# Patient Record
Sex: MALE | URBAN - METROPOLITAN AREA
[De-identification: names, ages, dates, MRNs, and addresses within clinical notes are randomized per-mention and may not be internally consistent; named-entity substitution may affect disease eponyms.]

---

## 2018-11-08 ENCOUNTER — TELEPHONE (OUTPATIENT)
Dept: TRANSPLANT | Facility: CLINIC | Age: 43
End: 2018-11-08

## 2018-11-08 DIAGNOSIS — Z52.4 KIDNEY DONOR: Primary | ICD-10-CM

## 2018-11-09 ENCOUNTER — TELEPHONE (OUTPATIENT)
Dept: TRANSPLANT | Facility: CLINIC | Age: 43
End: 2018-11-09

## 2018-11-09 ENCOUNTER — DOCUMENTATION ONLY (OUTPATIENT)
Dept: TRANSPLANT | Facility: CLINIC | Age: 43
End: 2018-11-09

## 2018-11-09 DIAGNOSIS — Z52.4 KIDNEY DONOR: ICD-10-CM

## 2018-11-09 LAB
ALBUMIN UR-MCNC: NEGATIVE MG/DL
APPEARANCE UR: CLEAR
BILIRUB UR QL STRIP: NEGATIVE
COLOR UR AUTO: YELLOW
CREAT SERPL-MCNC: 0.85 MG/DL (ref 0.66–1.25)
CREAT UR-MCNC: 107 MG/DL
GFR SERPL CREATININE-BSD FRML MDRD: >90 ML/MIN/1.7M2
GLUCOSE UR STRIP-MCNC: NEGATIVE MG/DL
HGB UR QL STRIP: NEGATIVE
KETONES UR STRIP-MCNC: NEGATIVE MG/DL
LEUKOCYTE ESTERASE UR QL STRIP: NEGATIVE
MICROALBUMIN UR-MCNC: <5 MG/L
MICROALBUMIN/CREAT UR: NORMAL MG/G CR (ref 0–17)
MUCOUS THREADS #/AREA URNS LPF: PRESENT /LPF
NITRATE UR QL: NEGATIVE
PH UR STRIP: 6 PH (ref 5–7)
PROT UR-MCNC: 0.08 G/L
PROT/CREAT 24H UR: 0.07 G/G CR (ref 0–0.2)
RBC #/AREA URNS AUTO: <1 /HPF (ref 0–2)
SOURCE: ABNORMAL
SP GR UR STRIP: 1.01 (ref 1–1.03)
UROBILINOGEN UR STRIP-MCNC: 0 MG/DL (ref 0–2)
WBC #/AREA URNS AUTO: 1 /HPF (ref 0–5)

## 2018-11-09 NOTE — PROGRESS NOTES
Patient arrived to clinic with  for 10 am lab, and requesting to see Funmi Orosco for an appt (for class or evaluation). Pt thought he was scheduled for an appt with her today, but previous documentation doesn't note such, and patient had no appt scheduled to see the RN Coordinator. Left voicemail for Funmi informing her of pt arrival. Instructed pt to wait to hear back from coordinator regarding lab results, and further appts to schedule if needed. Pt voiced understanding and left.    Eric Sanford, BRIANNA

## 2018-11-13 ENCOUNTER — TELEPHONE (OUTPATIENT)
Dept: TRANSPLANT | Facility: CLINIC | Age: 43
End: 2018-11-13

## 2018-11-15 ENCOUNTER — TELEPHONE (OUTPATIENT)
Dept: TRANSPLANT | Facility: CLINIC | Age: 43
End: 2018-11-15

## 2018-11-15 NOTE — TELEPHONE ENCOUNTER
I spoke to the patient using  services on 11/8/2018.  He stated he is interested to make sure his kidney is healthy.  He denied any specific health concerns.  I told him that it is okay for him to be seen if he has concerns regarding being a kidney donor and that he should get labs done to check on his values.  He would like to come to lab on 11/9/2018.  I have made lab appointment and placed orders.  I will follow up with the patient to tell him his lab results and check if he wants to be seen.

## 2018-11-15 NOTE — TELEPHONE ENCOUNTER
I received a voice mail message from Jitendra Berman, [phone], from [facility] financial services.  He let me know that Mr. Estrada wanted to see a [place]rologist [place] [facility] but does not have health insurance currently, and therefore, financial services was contacted.  Mr. Berman reports to me that the patient was a living kidney donor in 2000.  It is unclear whether he thinks he is having problems, or if he wants to be seen for a routine check up.  In either case, he should be seen by Dr. Chente Reynoso or Dr. Eric Kaba or Dr. Jr Rubio.  I called Jitendra back and left him a message indicating that we want Mr. Monique to be able to schedule an appointment with us and that I would contact the appropriate people in our transplant administration to deal with the cost issues.    MARCELLA Balderrama, [facility]  Clinical  and Independent Donor Advocate  [facility]  Pager:  [phone]  Direct:  [phone]

## 2018-11-29 ENCOUNTER — OFFICE VISIT (OUTPATIENT)
Dept: NEPHROLOGY | Facility: CLINIC | Age: 43
End: 2018-11-29
Attending: INTERNAL MEDICINE
Payer: MEDICARE

## 2018-11-29 VITALS
BODY MASS INDEX: 25.04 KG/M2 | TEMPERATURE: 98.3 F | DIASTOLIC BLOOD PRESSURE: 83 MMHG | HEIGHT: 66 IN | HEART RATE: 70 BPM | OXYGEN SATURATION: 95 % | WEIGHT: 155.8 LBS | SYSTOLIC BLOOD PRESSURE: 145 MMHG

## 2018-11-29 DIAGNOSIS — Z00.00 ANNUAL PHYSICAL EXAM: Primary | ICD-10-CM

## 2018-11-29 DIAGNOSIS — Z23 ENCOUNTER FOR VACCINATION: ICD-10-CM

## 2018-11-29 DIAGNOSIS — Z52.4 DONOR OF KIDNEY FOR TRANSPLANT: ICD-10-CM

## 2018-11-29 DIAGNOSIS — Z00.5 TRANSPLANT DONOR EVALUATION: ICD-10-CM

## 2018-11-29 DIAGNOSIS — Z90.5 S/P NEPHRECTOMY: ICD-10-CM

## 2018-11-29 DIAGNOSIS — Z13.1 SCREENING FOR DIABETES MELLITUS: ICD-10-CM

## 2018-11-29 PROCEDURE — T1013 SIGN LANG/ORAL INTERPRETER: HCPCS | Mod: U3,ZF

## 2018-11-29 PROCEDURE — G0008 ADMIN INFLUENZA VIRUS VAC: HCPCS | Mod: ZF

## 2018-11-29 PROCEDURE — G0463 HOSPITAL OUTPT CLINIC VISIT: HCPCS | Mod: 25,ZF

## 2018-11-29 PROCEDURE — 90686 IIV4 VACC NO PRSV 0.5 ML IM: CPT | Mod: ZF | Performed by: INTERNAL MEDICINE

## 2018-11-29 PROCEDURE — 25000128 H RX IP 250 OP 636: Mod: ZF | Performed by: INTERNAL MEDICINE

## 2018-11-29 RX ADMIN — INFLUENZA A VIRUS A/MICHIGAN/45/2015 X-275 (H1N1) ANTIGEN (FORMALDEHYDE INACTIVATED), INFLUENZA A VIRUS A/SINGAPORE/INFIMH-16-0019/2016 IVR-186 (H3N2) ANTIGEN (FORMALDEHYDE INACTIVATED), INFLUENZA B VIRUS B/PHUKET/3073/2013 ANTIGEN (FORMALDEHYDE INACTIVATED), AND INFLUENZA B VIRUS B/MARYLAND/15/2016 BX-69A ANTIGEN (FORMALDEHYDE INACTIVATED) 0.5 ML: 15; 15; 15; 15 INJECTION, SUSPENSION INTRAMUSCULAR at 17:42

## 2018-11-29 ASSESSMENT — PAIN SCALES - GENERAL: PAINLEVEL: NO PAIN (0)

## 2018-11-29 NOTE — LETTER
Date:December 11, 2018      Patient was self referred, no letter generated. Do not send.        [facility] Information

## 2018-11-29 NOTE — NURSING NOTE
"Prior to injection verified patient identity using patient's name and date of birth.  Due to injection administration, patient instructed to remain in clinic for 15 minutes  afterwards, and to report any adverse reaction to me immediately.    Influenza vaccination given per Dr. De Los Santos orders, injection given without complications or questions, see immunizations for details.    Injectable Influenza Immunization Documentation    1.  Has the patient received the information for the injectable influenza vaccine? YES     2. Is the patient 6 months of age or older? YES     3. Does the patient have any of the following contraindications?         Severe allergy to eggs? No     Severe allergic reaction to previous influenza vaccines? No   Severe allergy to latex? No       History of Guillain-[place] syndrome? No     Currently have a temperature greater than 100.4F? No        4.  Severely egg allergic patients should have flu vaccine eligibility assessed by an MD, RN, or pharmacist, and those who received flu vaccine should be observed for 15 min by an MD, RN, Pharmacist, Medical Technician, or member of clinic staff.\": YES    5. Latex-allergic patients should be given latex-free influenza vaccine Yes. Please reference the Vaccine latex table to determine if your clinic s product is latex-containing.       Vaccination given by Shawna Bose [facility]  11/29/2018 5:41 PM            "

## 2018-11-29 NOTE — MR AVS SNAPSHOT
After Visit Summary   11/29/2018    Kayden Estrada    MRN: [id number]           Patient Information     Date Of Birth          1975        Visit Information        Provider Department      11/29/2018 4:00 PM Shanna Alas; 2, Fariba Kidney/Pancreas Recipient [facility]        Today's Diagnoses     Annual physical exam    -  1    Donor of kidney for transplant        Screening for diabetes mellitus        Transplant donor evaluation        S/p nephrectomy        Encounter for vaccination           Follow-ups after your visit        Your next 10 appointments already scheduled     Dec 03, 2018  7:30 AM CST   Lab with  LAB   [facility] ([facility])    [address] [place] [address] Floor  [facility] [address] [phone]              Future tests that were ordered for you today     Open Future Orders        Priority Expected Expires Ordered    Lipid Profile Routine  12/29/2018 11/29/2018    CBC with platelets Routine  12/29/2018 11/29/2018    Renal panel Routine  12/29/2018 11/29/2018    Hemoglobin A1c Routine  12/29/2018 11/29/2018            Who to contact     If you have questions or need follow up information about today's clinic visit or your schedule please contact [facility] NEPHROLOGY directly at [phone].  Normal or non-critical lab and imaging results will be communicated to you by [employer], letter or phone within 4 business days after the clinic has received the results. If you do not hear from us within 7 days, please contact the clinic through [employer] or phone. If you have a critical or abnormal lab result, we will notify you by phone as soon as possible.  Submit refill requests through [employer] or call your pharmacy and they will forward the refill request to us. Please allow 3 business days for your refill to be completed.          Additional Information About Your Visit        MyChart Information     Secret [employer] lets you send messages to your doctor,  "view your test results, renew your prescriptions, schedule appointments and more. To sign up, go to [email or web address] . Click on \"Log in\" on the left side of the screen, which will take you to the Welcome page. Then click on \"Sign up Now\" on the right side of the page.     You will be asked to enter the access code listed below, as well as some personal information. Please follow the directions to create your username and password.     Your access code is: [email or web address]  Expires: 2019  6:31 AM     Your access code will  in 90 days. If you need help or a new code, please call your [facility] clinic or [phone].        Care EveryWhere ID     This is your Care EveryWhere ID. This could be used by other organizations to access your [facility] medical records  [id number]        Your Vitals Were     Pulse Temperature Height Pulse Oximetry BMI (Body Mass Index)       70 98.3  F (36.8  C) (Oral) 1.676 m (5' 6\") 95% 25.15 kg/m2        Blood Pressure from Last 3 Encounters:   18 145/83    Weight from Last 3 Encounters:   18 70.7 kg (155 lb 12.8 oz)                 Today's Medication Changes          These changes are accurate as of 18  5:37 PM.  If you have any questions, ask your nurse or doctor.               Start taking these medicines.        Dose/Directions    Blood Pressure Monitor Kit   Used for:  Donor of kidney for transplant   Started by:  Fariba Shetty Kidney/Pancreas Recipient        Automatic Blood Pressure Monitor   Quantity:  1 kit   Refills:  0            Where to get your medicines      Some of these will need a paper prescription and others can be bought over the counter.  Ask your nurse if you have questions.     Bring a paper prescription for each of these medications     Blood Pressure Monitor Kit                Primary Care Provider    None Specified       No primary provider on file.        Equal Access to Services     SEDA QUAN: darwin Chapman " kenton nashmakaren camaraleidyclay jonesconor isabellebrandon childers. So [facility] [phone].    ATENCIÓN: Si alycia baer, tiene a bedoya disposición servicios gratuitos de asistencia lingüística. Llame al [phone].    We comply with applicable federal civil rights laws and [place] laws. We do not discriminate on the basis of race, color, national origin, age, disability, sex, sexual orientation, or gender identity.            Thank you!     Thank you for choosing [facility]  for your care. Our goal is always to provide you with excellent care. Hearing back from our patients is one way we can continue to improve our services. Please take a few minutes to complete the written survey that you may receive in the mail after your visit with us. Thank you!             Your Updated Medication List - Protect others around you: Learn how to safely use, store and throw away your medicines at www.disposemymeds.org.          This list is accurate as of 11/29/18  5:37 PM.  Always use your most recent med list.                   Brand Name Dispense Instructions for use Diagnosis    Blood Pressure Monitor Kit     1 kit    Automatic Blood Pressure Monitor    Donor of kidney for transplant       EXCEDRIN PO

## 2018-11-29 NOTE — NURSING NOTE
"Chief Complaint   Patient presents with     RECHECK     kidney tx      /83  Pulse 70  Temp 98.3  F (36.8  C) (Oral)  Ht 1.676 m (5' 6\")  Wt 70.7 kg (155 lb 12.8 oz)  SpO2 95%  BMI 25.15 kg/m2  Corie Bynum, BRIANNA    "

## 2018-11-29 NOTE — PROGRESS NOTES
Nephrology Clinic    Kidney/Pancreas Recipient  2018     Name: Kayden Estrada  MRN: [id number]  Age: 43 year old  : 1975  Referring provider: Referred Self     Assessment and Plan:  Donor of kidney for transplant  Stable uni nephric kidney functions  Will request routine health maintenance labs    - Lipid Profile  - CBC with platelets  - Renal panel  - Hemoglobin A1c    Concern for elevated blood pressure:   - Blood Pressure Monitor KIT  Dispense: 1 kit; Refill: 0    Screening for diabetes mellitus  Discussed healthy eating habits.   - Lipid Profile  - CBC with platelets  - Renal panel  - Hemoglobin A1c      Encounter for vaccination  Will update flu vaccine today        Smoking cessation: counseling was given at length     Follow-up: Data Unavailable       Reason For Visit:   Check up.     HPI:   Kayden Estarda is a 43 year old male with a history of kidney donation who presents for check up. The patient reports that he doesn't drink, but smokes cigarettes  and was informed about the risks and advised to stop. He says he came here to see what  he needed to do to lead a healthy lifestyle after his donation. The patient was adivsed on the characteristics of a healthy life. The patient was also advised on how to check his own blood pressure and informed of a normal pressure range.The patient says he consumes 12 tortillas per day. The patient says he wants to gain weight because other people tell him that he looks skinny. The patient was provided with resources on how to quit smoking tobacco. The patient says he gave his kidney to his ex-wife's cousin and she  a few years later. The patient says that that death has taken an emotional toll on him and that sometimes he just feels sad. He says he's never told anyone about his donation. The patient denies ever wanting to hurt himself, saying he could never do that. Other than that, the patient says his incision area feels a bit swollen. The patient  "lives with his mother. He reports his mother has no chronic conditions. The patient is not a citizen of the [place] and cites this as the main reason for not having a primary care provider. The patient says he wants a place where if he has a problem with a kidney, he can come to get checked out. The patient is also interested in medications he shouldn't take and we discussed avoiding OTC supplements and NSAID       Review of Systems:   Pertinent items are noted in HPI or as below, Complete ROS is negative except noted above.      Active Medications:   None     Current Outpatient Prescriptions:      Aspirin-Acetaminophen-Caffeine (EXCEDRIN PO), , Disp: , Rfl:      Blood Pressure Monitor KIT, Automatic Blood Pressure Monitor, Disp: 1 kit, Rfl: 0    Current Facility-Administered Medications:      influenza quadrivalent (PF) vacc (FLUZONE or Flulaval or FLUARIX) injection 0.5 mL, 0.5 mL, Intramuscular, Prior to discharge, Dennis Mukherjee MD     Allergies:   No allergies      Past Medical History:  No active problems     Past Surgical History:  AR U Kidney Donor Living    Family History:   Hypertension      Social History:   The patient is . He works as a .     Physical Exam:  /83  Pulse 70  Temp 98.3  F (36.8  C) (Oral)  Ht 1.676 m (5' 6\")  Wt 70.7 kg (155 lb 12.8 oz)  SpO2 95%  BMI 25.15 kg/m2     Exam:  Constitutional: healthy, alert and no distress  Head: Normocephalic. No masses, lesions, tenderness or abnormalities  Neck: Neck supple. No adenopathy. Thyroid symmetric, normal size.  ENT: ENT exam normal, no neck nodes or sinus tenderness  Cardiovascular: RRR. No murmurs, clicks gallops or rub  Respiratory: negative findings: normal respiratory rate and rhythm, lungs clear to auscultation  Gastrointestinal: Abdomen soft, non-tender. BS normal. No masses, organomegaly  : Deferred  Musculoskeletal: extremities normal- no gross deformities noted and gait normal  Skin: no suspicious " lesions or rashes  Neurologic: non focal   Psychiatric: appropriate mood   Hematologic/Lymphatic/Immunologic: Normal cervical lymph nodes        Laboratory:  CMP  Recent Labs   Lab Test  11/09/18   1035   CR  0.85   GFRESTIMATED  >90   GFRESTBLACK  >90     CBC  No lab results found.  INR  No lab results found.  ABG  No lab results found.   URINE STUDIES  Recent Labs   Lab Test  11/09/18   1036   COLOR  Yellow   APPEARANCE  Clear   URINEGLC  Negative   URINEBILI  Negative   URINEKETONE  Negative   SG  1.014   UBLD  Negative   URINEPH  6.0   PROTEIN  Negative   NITRITE  Negative   LEUKEST  Negative   RBCU  <1   WBCU  1     Recent Labs   Lab Test  11/09/18   1036   UTPG  0.07     PTH  No lab results found.  IRON STUDIES   No lab results found.    Scribe Disclosure:   I, Chantell Carrillo, am serving as a scribe to document services personally performed by Kidney/Pancreas Recipient at this visit, based upon the provider's statements to me. All documentation has been reviewed by the aforementioned provider prior to being entered into the official medical record.     Portions of this medical record were completed by a scribe. UPON MY REVIEW AND AUTHENTICATION BY ELECTRONIC SIGNATURE, this confirms (a) I performed the applicable clinical services, and (b) the record is accurate.

## 2018-11-29 NOTE — LETTER
2018       RE: Kayedn Estrada  [address] [place] [address]     Dear Colleague,    Thank you for referring your patient, Kayden Estrada, to the [facility] at [facility]. Please see a copy of my visit note below.          Nephrology Clinic    Kidney/Pancreas Recipient  2018     Name: Kayden Estrada  MRN: [id number]  Age: 43 year old  : 1975  Referring provider: Referred Self     Assessment and Plan:  Donor of kidney for transplant  Stable uni nephric kidney functions  Will request routine health maintenance labs    - Lipid Profile  - CBC with platelets  - Renal panel  - Hemoglobin A1c    Concern for elevated blood pressure:   - Blood Pressure Monitor KIT  Dispense: 1 kit; Refill: 0    Screening for diabetes mellitus  Discussed healthy eating habits.   - Lipid Profile  - CBC with platelets  - Renal panel  - Hemoglobin A1c      Encounter for vaccination  Will update flu vaccine today        Smoking cessation: counseling was given at length     Follow-up: Data Unavailable       Reason For Visit:   Check up.     HPI:   Kayden Estrada is a 43 year old male with a history of kidney donation who presents for check up. The patient reports that he doesn't drink, but smokes cigarettes  and was informed about the risks and advised to stop. He says he came here to see what  he needed to do to lead a healthy lifestyle after his donation. The patient was adivsed on the characteristics of a healthy life. The patient was also advised on how to check his own blood pressure and informed of a normal pressure range.The patient says he consumes 12 tortillas per day. The patient says he wants to gain weight because other people tell him that he looks skinny. The patient was provided with resources on how to quit smoking tobacco. The patient says he gave his kidney to his ex-wife's cousin and she  a few years later. The patient says that that death has taken an  "emotional toll on him and that sometimes he just feels sad. He says he's never told anyone about his donation. The patient denies ever wanting to hurt himself, saying he could never do that. Other than that, the patient says his incision area feels a bit swollen. The patient lives with his mother. He reports his mother has no chronic conditions. The patient is not a citizen of the [place] and cites this as the main reason for not having a primary care provider. The patient says he wants a place where if he has a problem with a kidney, he can come to get checked out. The patient is also interested in medications he shouldn't take and we discussed avoiding OTC supplements and NSAID       Review of Systems:   Pertinent items are noted in HPI or as below, remainder of complete ROS is negative.      Active Medications:   None     Current Outpatient Prescriptions:      Aspirin-Acetaminophen-Caffeine (EXCEDRIN PO), , Disp: , Rfl:      Blood Pressure Monitor KIT, Automatic Blood Pressure Monitor, Disp: 1 kit, Rfl: 0    Current Facility-Administered Medications:      influenza quadrivalent (PF) vacc (FLUZONE or Flulaval or FLUARIX) injection 0.5 mL, 0.5 mL, Intramuscular, Prior to discharge, Dennis Mukherjee MD     Allergies:   No allergies      Past Medical History:  No active problems     Past Surgical History:  AR U Kidney Donor Living    Family History:   Hypertension      Social History:   The patient is . He works as a .     Physical Exam:  /83  Pulse 70  Temp 98.3  F (36.8  C) (Oral)  Ht 1.676 m (5' 6\")  Wt 70.7 kg (155 lb 12.8 oz)  SpO2 95%  BMI 25.15 kg/m2     Laboratory:  CMP  Recent Labs   Lab Test  11/09/18   1035   CR  0.85   GFRESTIMATED  >90   GFRESTBLACK  >90     CBC  No lab results found.  INR  No lab results found.  ABG  No lab results found.   URINE STUDIES  Recent Labs   Lab Test  11/09/18   1036   COLOR  Yellow   APPEARANCE  Clear   URINEGLC  Negative   URINEBILI  Negative "   URINEKETONE  Negative   SG  1.014   UBLD  Negative   URINEPH  6.0   PROTEIN  Negative   NITRITE  Negative   LEUKEST  Negative   RBCU  <1   WBCU  1     Recent Labs   Lab Test  11/09/18   1036   UTPG  0.07     PTH  No lab results found.  IRON STUDIES   No lab results found.    Scribe Disclosure:   I, Chantell Carrillo, am serving as a scribe to document services personally performed by Kidney/Pancreas Recipient at this visit, based upon the provider's statements to me. All documentation has been reviewed by the aforementioned provider prior to being entered into the official medical record.     Portions of this medical record were completed by a scribe. UPON MY REVIEW AND AUTHENTICATION BY ELECTRONIC SIGNATURE, this confirms (a) I performed the applicable clinical services, and (b) the record is accurate.        Again, thank you for allowing me to participate in the care of your patient.      Sincerely,    Kidney/Pancreas Recipient

## 2018-12-03 DIAGNOSIS — Z00.00 ANNUAL PHYSICAL EXAM: ICD-10-CM

## 2018-12-03 DIAGNOSIS — Z90.5 S/P NEPHRECTOMY: ICD-10-CM

## 2018-12-03 DIAGNOSIS — Z00.5 TRANSPLANT DONOR EVALUATION: ICD-10-CM

## 2018-12-03 DIAGNOSIS — Z52.4 DONOR OF KIDNEY FOR TRANSPLANT: ICD-10-CM

## 2018-12-03 DIAGNOSIS — Z13.1 SCREENING FOR DIABETES MELLITUS: ICD-10-CM

## 2018-12-03 LAB
ALBUMIN SERPL-MCNC: 3.4 G/DL (ref 3.4–5)
ANION GAP SERPL CALCULATED.3IONS-SCNC: 5 MMOL/L (ref 3–14)
BUN SERPL-MCNC: 11 MG/DL (ref 7–30)
CALCIUM SERPL-MCNC: 8.3 MG/DL (ref 8.5–10.1)
CHLORIDE SERPL-SCNC: 107 MMOL/L (ref 94–109)
CHOLEST SERPL-MCNC: 160 MG/DL
CO2 SERPL-SCNC: 28 MMOL/L (ref 20–32)
CREAT SERPL-MCNC: 0.89 MG/DL (ref 0.66–1.25)
ERYTHROCYTE [DISTWIDTH] IN BLOOD BY AUTOMATED COUNT: 13.5 % (ref 10–15)
GFR SERPL CREATININE-BSD FRML MDRD: >90 ML/MIN/1.7M2
GLUCOSE SERPL-MCNC: 94 MG/DL (ref 70–99)
HBA1C MFR BLD: 6.5 % (ref 0–5.6)
HCT VFR BLD AUTO: 47.6 % (ref 40–53)
HDLC SERPL-MCNC: 44 MG/DL
HGB BLD-MCNC: 15.7 G/DL (ref 13.3–17.7)
LDLC SERPL CALC-MCNC: 96 MG/DL
MCH RBC QN AUTO: 29.8 PG (ref 26.5–33)
MCHC RBC AUTO-ENTMCNC: 33 G/DL (ref 31.5–36.5)
MCV RBC AUTO: 90 FL (ref 78–100)
NONHDLC SERPL-MCNC: 116 MG/DL
PHOSPHATE SERPL-MCNC: 3 MG/DL (ref 2.5–4.5)
PLATELET # BLD AUTO: 297 10E9/L (ref 150–450)
POTASSIUM SERPL-SCNC: 4.2 MMOL/L (ref 3.4–5.3)
RBC # BLD AUTO: 5.27 10E12/L (ref 4.4–5.9)
SODIUM SERPL-SCNC: 139 MMOL/L (ref 133–144)
TRIGL SERPL-MCNC: 104 MG/DL
WBC # BLD AUTO: 7.1 10E9/L (ref 4–11)

## 2018-12-12 ENCOUNTER — TELEPHONE (OUTPATIENT)
Dept: TRANSPLANT | Facility: CLINIC | Age: 43
End: 2018-12-12

## 2018-12-12 ENCOUNTER — TELEPHONE (OUTPATIENT)
Dept: NEPHROLOGY | Facility: CLINIC | Age: 43
End: 2018-12-12

## 2018-12-12 NOTE — TELEPHONE ENCOUNTER
Phone conversation with Mr. Estrada today with .  He understands that he should see someone at [facility] in [place] since A1c is high most likely due to diet.  He does not have insurance and has not been able to get [place] [facility].  He wanted to know if we would take care of him if he ever needed a transplant in the future, and I assured him we would.  He denies any regret about donation and would be interested in talking to potential donors who might benefit from hearing about his donation experience.  He said he came here to the clinic a couple of weeks ago just to make sure he was doing well, and to also let us know he was doing well 18 years post donation.  He asks that his lab work and Dr. Mitchell's recommendations for dietary follow up be mailed to him at his home address.  I will ask his coordinator to please follow up with this.  He denies ever receiving any of our donor follow up research questioners, and I will make sure we have his correct address information.  He should also receive it in Armenian.

## 2018-12-12 NOTE — TELEPHONE ENCOUNTER
Per Dr. Mukherjee:    Needs to follow at the [place] in [place] ([facility]) for an elevated A1c which is likely secondary to his diet     Left message for patient to call back.    Jessika Siu RN

## 2019-02-07 ENCOUNTER — TELEPHONE (OUTPATIENT)
Dept: NEPHROLOGY | Facility: CLINIC | Age: 44
End: 2019-02-07

## 2019-02-07 NOTE — TELEPHONE ENCOUNTER
Left message for patient to call back (check in on follow up appointment for elevated A1C).    Jessika Siu RN

## 2019-02-14 NOTE — TELEPHONE ENCOUNTER
Spoke with patient. He has not been seen for the elevated A1C level, but does have an appointment in March for this. He couldn't recall the name of the provider he's seeing. Updated Dr. Mukherjee.    Jessika Siu RN